# Patient Record
(demographics unavailable — no encounter records)

---

## 2024-12-09 NOTE — PHYSICAL EXAM
[TextEntry] : -- General:  awake, alert, NAD, appears well and appropriate for age HEENT:  eyes clear without injection or discharge, nares clear without discharge throat without erythema nor exudate, tonsils appear normal in size Lymph:  no cervical or axillary lymphadenopathy CV: RRR, no mrg Resp: CTAB without increased work of breathing Abdomen: normoactive bowel sounds, soft, NT, ND, no masses Extr: warm and well perfused Skin: no rashes

## 2024-12-09 NOTE — ASSESSMENT
[TextEntry] : -- Yenny continues to do well with rifampin with no side effects.  No need to repeat any labs today as baseline CMP and follow-up CMP after over a month on rifampin were both normal. Yenny has no new concerns and no signs/symptoms of liver toxicity.  - continue rifampin 600mg po daily, total course 4 months. No need to restart due to missing 1 week of pills. We will just complete a total of 4 months of treatment.  Started 9/12/2024 so would expect her to get through all 4 bottles by end of January 2025. - RTC near completion of therapy  Flash Spear MD, MS Attending - Infectious Disease [facility] Phone [phone] Fax: [phone]

## 2024-12-09 NOTE — HISTORY OF PRESENT ILLNESS
[FreeTextEntry2] : -- Yenny Messer is a 17 yo girl here returning for follow-up on treatment for latent tuberculosis.  Yenny was born in [place] and does not travel.  She does not have foreign visitors.  Her mother is from [facility]. No known TB contacts.  No exposure to high risk populations that Yenny is aware of.  No chronic cough, no SOB, no fevers, no night sweats, no chills, no unintentional weight changes.   Takes rizatriptan for migraine PRN, and takes Aleve or ibuprofen PRN also for migraine headaches.  Prescribed melatonin, CoQ10, MgOx but says she is not taking those meds.  No meds for mood or anxiety, no contraception meds.  9/12/2024: FOLLOW-UP CMP and Chest XR are both normal.  Yenny and her mother would like to proceed with treatment for latent tuberculosis. No interval concerns, no new questions.  10/24/2024:  FOLLOW-UP Started rifampin as planned after last visit.  Likes to take it in the morning and has not had any GI disturbance from this.  Notices orange urine that clears by bedtime.  I had counseled her about this, so she was not surprised and it has not been a problem.  Yenny and her mother did not understand that she had refills for the full 4 month course, so the initial 30 day supply had run out and she waited for this appointment.   We confirmed that she had refills and they ordered from [facility] during our visit.   They think she missed about 1 week.   12/5/2024:  FOLLOW-UP Continues to tolerate rifampin without side effects.  Getting refills on time now and has not missed any more doses. No new medications.  No new symptoms.  Specifically no N/V/D, no abdominal pain, no change in appetite, no jaundice. Has been well in the interim. --

## 2024-12-09 NOTE — REASON FOR VISIT
[Follow-Up Consultation] : a follow-up consultation visit for [Patient] : patient [Mother] : mother [FreeTextEntry3] : Latent tuberculosis

## 2024-12-09 NOTE — CONSULT LETTER
[Dear  ___] : Dear  [unfilled], [Courtesy Letter:] : I had the pleasure of seeing your patient, [unfilled], in my office today. [Please see my note below.] : Please see my note below. [Sincerely,] : Sincerely, [FreeTextEntry3] : Flash Spear MD, MS Attending - Infectious Disease [facility] Phone [phone] Fax: [phone]

## 2025-01-30 NOTE — END OF VISIT
[Time Spent: ___ minutes] : I have spent [unfilled] minutes of time on the encounter which excludes teaching and separately reported services. [FreeTextEntry3] : Fellow Dr. Perez participated in this visit but the documentation is written in its entirety by this writer, the attending Dr. Spear.  I reviewed the case with the fellow and then independently saw the patient for focused history, examination, and counseling. The note was written by me exclusively.  This is a return patient. Level 4 billing.  Addressed multiple chronic problems, reviewed > 3 lab results, ordered > 3 labs, medication toxicity monitoring.

## 2025-01-30 NOTE — CONSULT LETTER
[Dear  ___] : Dear  [unfilled], [Please see my note below.] : Please see my note below. [Sincerely,] : Sincerely, [FreeTextEntry3] : Flash Spear MD, MS Attending - Infectious Disease [facility] Phone [phone] Fax: [phone] --

## 2025-01-30 NOTE — HISTORY OF PRESENT ILLNESS
[FreeTextEntry2] : -- Yenny Messer is a 17 yo girl here returning for follow-up on treatment for latent tuberculosis.  Yenny was born in [place] and does not travel.  She does not have foreign visitors.  Her mother is from [facility]. No known TB contacts.  No exposure to high risk populations that Yenny is aware of.  No chronic cough, no SOB, no fevers, no night sweats, no chills, no unintentional weight changes.   Takes rizatriptan for migraine PRN, and takes Aleve or ibuprofen PRN also for migraine headaches.  Prescribed melatonin, CoQ10, MgOx but says she is not taking those meds.  No meds for mood or anxiety, no contraception meds.  9/12/2024: FOLLOW-UP CMP and Chest XR are both normal.  Yenny and her mother would like to proceed with treatment for latent tuberculosis. No interval concerns, no new questions.  10/24/2024:  FOLLOW-UP Started rifampin as planned after last visit.  Likes to take it in the morning and has not had any GI disturbance from this.  Notices orange urine that clears by bedtime.  I had counseled her about this, so she was not surprised and it has not been a problem.  Yenny and her mother did not understand that she had refills for the full 4 month course, so the initial 30 day supply had run out and she waited for this appointment.   We confirmed that she had refills and they ordered from [facility] during our visit.   They think she missed about 1 week.   12/5/2024:  FOLLOW-UP Continues to tolerate rifampin without side effects.  Getting refills on time now and has not missed any more doses. No new medications.  No new symptoms.  Specifically no N/V/D, no abdominal pain, no change in appetite, no jaundice. Has been well in the interim.  1/30/2025:  FOLLOW-UP: Continues to take rifampin daily without side effects.  Missed maybe two doses since last visit.  No new symptoms. Had a "cold" beginning of this month that has now resolved.  No fever, no lingering cough. -- --

## 2025-01-30 NOTE — PHYSICAL EXAM
[TextEntry] : -- General:  awake, alert, NAD, appears well and appropriate for age Lymph:  no cervical or axillary lymphadenopathy CV: RRR, no mrg Resp: CTAB without increased work of breathing Abdomen: normoactive bowel sounds, soft, NT, ND, no masses Extr: warm and well perfused Skin: no rashes

## 2025-01-30 NOTE — CONSULT LETTER
[Dear  ___] : Dear  [unfilled], [Please see my note below.] : Please see my note below. [Sincerely,] : Sincerely, [FreeTextEntry3] : Flash Spear MD, MS Attending - Infectious Disease [facility] Phone [phone] Fax: [phone] --

## 2025-01-30 NOTE — ASSESSMENT
[TextEntry] : -- Yenny has completed a course of 4 months of rifampin for treatment of latent tuberculosis.  I have written letters of completion and will include this below.  They are also saved to this EMR.  No need for any return visit.  Counseling per letter below.  January 30, 2025  To Whom it May Concern:  Yenny Messer was diagnosed with latent tuberculosis infection at first visit with pediatric Infectious Disease 8/1/2024 and began treatment with rifampin after evaluation was complete on 9/12/2024.  7/9/2024 Quantiferon Gold TB screen: Positive 8/10/2024 Chest XR - normal lungs  9/12/2024 began rifampin 600mg po bedtime and has continued since. She has been seen in Infectious Disease clinic and has been monitored. She tolerated medication. Treatment completed as of this visit 1/30/2025.  Yenny Messer should not have Quantiferon TB testing, TB Spot testing, or skin PPD testing again in the future. These tests would be expected to remain positive. Skin PPD testing could cause scarring.  If there are concerns regarding active TB infection in the future, screening questions should be asked and if there is a concern for clinical active TB, a chest XR should be performed.  Please contact our office with any questions or concerns.   Sincerely,    Flash Spear MD, MS Attending - Infectious Disease [facility] Phone [phone] Fax: [phone]

## 2025-01-30 NOTE — ASSESSMENT
[TextEntry] : -- Yenny has completed a course of 4 months of rifampin for treatment of latent tuberculosis.  I have written letters of completion and will include this below.  They are also saved to this EMR.  No need for any return visit.  Counseling per letter below.  January 30, 2025  To Whom it May Concern:  Yenny Messer was diagnosed with latent tuberculosis infection at first visit with pediatric Infectious Disease 8/1/2024 and began treatment with rifampin after evaluation was complete on 9/12/2024.  7/9/2024 Quantiferon Gold TB screen: Positive 8/10/2024 Chest XR - normal lungs  9/12/2024 began rifampin 600mg po bedtime and has continued since. She has been seen in Infectious Disease clinic and has been monitored. She tolerated medication. Treatment completed as of this visit 1/30/2025.  Yenny Messer should not have Quantiferon TB testing, TB Spot testing, or skin PPD testing again in the future. These tests would be expected to remain positive. Skin PPD testing could cause scarring.  If there are concerns regarding active TB infection in the future, screening questions should be asked and if there is a concern for clinical active TB, a chest XR should be performed.  Please contact our office with any questions or concerns.   Sincerely,    Flash Spear MD, MS Attending - Infectious Disease [facility] Phone [phone] Fax: [phone]

## 2025-01-30 NOTE — HISTORY OF PRESENT ILLNESS
[FreeTextEntry2] : -- Yenny Messer is a 17 yo girl here returning for follow-up on treatment for latent tuberculosis.  Yenny was born in [place] and does not travel.  She does not have foreign visitors.  Her mother is from [facility]. No known TB contacts.  No exposure to high risk populations that Yenny is aware of.  No chronic cough, no SOB, no fevers, no night sweats, no chills, no unintentional weight changes.   Takes rizatriptan for migraine PRN, and takes Aleve or ibuprofen PRN also for migraine headaches.  Prescribed melatonin, CoQ10, MgOx but says she is not taking those meds.  No meds for mood or anxiety, no contraception meds.  9/12/2024: FOLLOW-UP CMP and Chest XR are both normal.  Yenny and her mother would like to proceed with treatment for latent tuberculosis. No interval concerns, no new questions.  10/24/2024:  FOLLOW-UP Started rifampin as planned after last visit.  Likes to take it in the morning and has not had any GI disturbance from this.  Notices orange urine that clears by bedtime.  I had counseled her about this, so she was not surprised and it has not been a problem.  Yenny and her mother did not understand that she had refills for the full 4 month course, so the initial 30 day supply had run out and she waited for this appointment.   We confirmed that she had refills and they ordered from [facility] during our visit.   They think she missed about 1 week.   12/5/2024:  FOLLOW-UP Continues to tolerate rifampin without side effects.  Getting refills on time now and has not missed any more doses. No new medications.  No new symptoms.  Specifically no N/V/D, no abdominal pain, no change in appetite, no jaundice. Has been well in the interim.  1/30/2025:  FOLLOW-UP: Continues to take rifampin daily without side effects.  Missed maybe two doses since last visit.  No new symptoms. Had a "cold" beginning of this month that has now resolved.  No fever, no lingering cough. -- --